# Patient Record
Sex: FEMALE | Race: WHITE | NOT HISPANIC OR LATINO | URBAN - METROPOLITAN AREA
[De-identification: names, ages, dates, MRNs, and addresses within clinical notes are randomized per-mention and may not be internally consistent; named-entity substitution may affect disease eponyms.]

---

## 2019-09-19 ENCOUNTER — NURSE TRIAGE (OUTPATIENT)
Dept: NURSING | Facility: CLINIC | Age: 64
End: 2019-09-19

## 2019-09-20 NOTE — TELEPHONE ENCOUNTER
" calling, is with Orly.  In town temporarily, is leaving to drive to [place] tomorrow morning.  Had HD today but did miss a couple of recent runs.  Has new onset LE edema, feels \"weak\".  Has DM with 1 BKA.  Legs have \"little blisters\" present.  Advised she should be seen in ED,  wanting to bring her to .  Advised of  hours, discussed limitations of .  Voiced concern of having her take a 15 hour drive with current status as this will likely worsen.      Reason for Disposition    Patient sounds very sick or weak to the triager    Additional Information    Negative: Entire foot is cool or blue in comparison to other side    Negative: Difficulty breathing at rest    Negative: [1] Can't walk or can barely walk AND [2] new onset    Negative: [1] Difficulty breathing with exertion (e.g., walking) AND [2] new onset or worsening    Negative: [1] Red area or streak AND [2] fever    Negative: [1] Swelling is painful to touch AND [2] fever    Negative: [1] Cast on leg or ankle AND [2] now increased pain    Protocols used: LEG SWELLING AND EDEMA-A-AH      "

## 2021-06-16 PROBLEM — E87.5 HYPERKALEMIA: Status: ACTIVE | Noted: 2019-09-12
